# Patient Record
Sex: FEMALE | Race: WHITE
[De-identification: names, ages, dates, MRNs, and addresses within clinical notes are randomized per-mention and may not be internally consistent; named-entity substitution may affect disease eponyms.]

---

## 2017-03-01 ENCOUNTER — HOSPITAL ENCOUNTER (EMERGENCY)
Dept: HOSPITAL 17 - PHED | Age: 62
Discharge: HOME | End: 2017-03-01
Payer: COMMERCIAL

## 2017-03-01 VITALS — OXYGEN SATURATION: 99 % | HEART RATE: 64 BPM

## 2017-03-01 VITALS — HEIGHT: 65 IN | WEIGHT: 146.39 LBS | BODY MASS INDEX: 24.39 KG/M2

## 2017-03-01 VITALS
HEART RATE: 63 BPM | DIASTOLIC BLOOD PRESSURE: 76 MMHG | SYSTOLIC BLOOD PRESSURE: 137 MMHG | OXYGEN SATURATION: 100 % | TEMPERATURE: 97.9 F | RESPIRATION RATE: 16 BRPM

## 2017-03-01 DIAGNOSIS — T78.49XA: Primary | ICD-10-CM

## 2017-03-01 DIAGNOSIS — I10: ICD-10-CM

## 2017-03-01 DIAGNOSIS — Z86.79: ICD-10-CM

## 2017-03-01 DIAGNOSIS — Z86.2: ICD-10-CM

## 2017-03-01 DIAGNOSIS — Z87.448: ICD-10-CM

## 2017-03-01 DIAGNOSIS — Z86.59: ICD-10-CM

## 2017-03-01 DIAGNOSIS — Z86.69: ICD-10-CM

## 2017-03-01 DIAGNOSIS — Z87.09: ICD-10-CM

## 2017-03-01 DIAGNOSIS — E03.9: ICD-10-CM

## 2017-03-01 DIAGNOSIS — E78.00: ICD-10-CM

## 2017-03-01 DIAGNOSIS — Z87.39: ICD-10-CM

## 2017-03-01 PROCEDURE — 96372 THER/PROPH/DIAG INJ SC/IM: CPT

## 2017-03-01 PROCEDURE — 99282 EMERGENCY DEPT VISIT SF MDM: CPT

## 2017-03-01 NOTE — PD
HPI


Chief Complaint:  Skin Problem


Time Seen by Provider:  04:52


Travel History


International Travel<30 days:  No


Contact w/Intl Traveler<30days:  No


Traveled to known affect area:  No





History of Present Illness


HPI


The patient is a 61-year-old female that she had a rash for 2 days.  She states 

she did change laundry detergent but not the fabric softener.  She denies any 

wheezing, chest pain or shortness of breath.  She does not know whether she 

took epinephrine before in the past.  She denies any history of heart disease.





PFSH


Past Medical History


Arthritis:  Yes (RHEUMATIOS; OSTEO; SOJOURNS (QUESTIONABLE))


Anxiety:  Yes


Depression:  Yes


Heart Rhythm Problems:  Yes (PALPITATIONS)


Cancer:  No


Cardiovascular Problems:  Yes (HTN)


High Cholesterol:  Yes


COPD:  Yes (CHRONIC BRONCHITIS)


Diabetes:  No


Diminished Hearing:  No


Fibromyalgia:  Yes


Glaucoma:  No


Headaches:  Yes


Hepatitis:  No


Hiatal Hernia:  No


Herniated Disk:  Yes (REMOVED WITH SURGERY)


Hypertension:  Yes


Immune Disorder:  Yes (GRAVE'S Dz;)


Insomnia:  Yes


Kidney Stones:  Yes


Respiratory:  Yes (COPD)


Migraines:  Yes


Shingles:  Yes


Thyroid Disease:  Yes (HYPOTHYROID)


Pregnant?:  Not Pregnant


Menopausal:  Yes


:  0





Past Surgical History


Abdominal Surgery:  Yes (CHOLECYSTECTOMY  , UMBILICAL hernia repair)


Cardiac Surgery:  No


Cholecystectomy:  Yes


Ear Surgery:  No


Endocrine Surgery:  No


Eye Surgery:  No


Genitourinary Surgery:  No


Gynecologic Surgery:  Yes ( LT BREAST BX)


Oral Surgery:  Yes (RT SALIVARY GLAND BX )


Pacemaker:  No


Thoracic Surgery:  No


Other Surgery:  Yes (SALIVARY GLANDS OPENED (R); BREAST BIOPSY (L), back surgery

)





Family History


Family Hypercholesterolemia:  Yes





Social History


Alcohol Use:  No


Tobacco Use:  No


Substance Use:  No





Allergies-Medications


(Allergen,Severity, Reaction):  


Coded Allergies:  


     Benadryl (Verified  Allergy, Severe, HEADACHE AND HEART RACING, 3/1/17)


     Codeine (Unverified  Allergy, Intermediate, illness, 3/1/17)


     Tetracycline (Verified  Allergy, Mild, NAUSEA, 3/1/17)


     Ephedrine (Verified  Adverse Reaction, Mild, TACHYCARDIA, 3/1/17)


     Iodine (Verified  Adverse Reaction, Mild, HOT/FLUSHED FEELING, 3/1/17)


     Penicillin (Verified  Adverse Reaction, Mild, NAUSEA, 3/1/17)


Reported Meds & Prescriptions





Reported Meds & Active Scripts


Active


Prednisone 50 Mg Tab 50 Mg PO BID


Zantac (Ranitidine HCl) 150 Mg Tab 150 Mg PO BID


Reported


Plaquenil (Hydroxychloroquine Sulfate) 200 Mg Tab 200 Mg PO DAILY


     Take with food


Simponi Inj (Golimumab) 50 Mg/0.5 Ml Syr 50 Mg SQ Q30D


Topicort Topical (Desoximetasone) 0.25% Cream 1 Applic TOPICAL BID


Naftin Topical (Naftifine HCl) 2 % Cream 1 Applic TOPICAL BID


[Tacrolimus 1%]   1 Applic TOP BID


[Areds]   1 Tab PO BID


Heiskell Thyroid (Thyroid) 90 Mg Tab 90 Mg PO DAILY


Metoprolol Tartrate 50 Mg Tab 50 Mg PO HS


Percocet (Oxycodone-Acetaminophen) 5-325 mg Tab 0.05 Tab PO BID


Atorvastatin (Atorvastatin Calcium) 20 Mg Tab 20 Mg PO HS


Xanax (Alprazolam) 0.5 Mg Tab 0.5 Mg PO HS








Review of Systems


Except as stated in HPI:  all other systems reviewed are Neg





Physical Exam


Narrative


GENERAL: The patient is alert, oriented 3 in minimal distress with her 

pruritic rash.


SKIN: Warm and dry.  There is an erythematous rash, confluent located on the 

back, arms and slightly on the abdomen.  It is also on the legs.


HEAD: Atraumatic. Normocephalic. 


EYES: Pupils equal and round. No scleral icterus. No injection or drainage. 


ENT: No nasal bleeding or discharge.  Mucous membranes pink and moist.


NECK: Trachea midline. No JVD. 


CARDIOVASCULAR: Regular rate and rhythm.  No murmur appreciated.


RESPIRATORY: No accessory muscle use. Clear to auscultation. Breath sounds 

equal bilaterally. 


GASTROINTESTINAL: Abdomen soft, non-tender, nondistended. Hepatic and splenic 

margins not palpable. 


MUSCULOSKELETAL: No obvious deformities. No clubbing.  No cyanosis.  No edema. 


NEUROLOGICAL: Awake and alert. No obvious cranial nerve deficits.  Motor 

grossly within normal limits. Normal speech.


PSYCHIATRIC: Appropriate mood and affect; insight and judgment normal.





Data


Data


Last Documented VS





Vital Signs








  Date Time  Temp Pulse Resp B/P Pulse Ox O2 Delivery O2 Flow Rate FiO2


 


3/1/17 05:16  64   99   


 


3/1/17 04:44   16     


 


3/1/17 04:31 97.9   137/76    








Orders





 Ecg Monitoring (3/1/17 04:52)


Oximetry (3/1/17 04:52)


Prednisone (Deltasone) (3/1/17 05:00)


Epinephrine (1:1000) Inj (Adrenalin (1:1 (3/1/17 05:00)


Famotidine (Pepcid) (3/1/17 05:00)








MDM


Medical Decision Making


Medical Screen Exam Complete:  Yes


Emergency Medical Condition:  Yes


Medical Record Reviewed:  Yes


Differential Diagnosis


Allergic reaction, cellulitis, angioedema


Narrative Course


The patient has an allergic reaction.  It is now 0530 and the patient feels 

much better.  The patient's heart rate 1 only as high as 90.  The patient is 

obviously not allergic to epinephrine.





Plan: The patient be given Zantac, tapered course of prednisone.





Diagnosis





 Primary Impression:  


 Allergic reaction to chemical substance





***Additional Instructions:


The patient may be allergic to her dishwashing detergent.  She denies changing 

fabric softeners.





Plan: The patient be given a tapered course of prednisone and a prescription of 

Zantac.  She should follow-up with an allergist if this becomes a recurrent 

problem for her.


***Med/Other Pt SpecificInfo:  Prescription(s) given


Scripts


Prednisone 50 Mg Tab50 Mg PO BID  #12 TAB  Ref 0


   Prov:Nestor Perdomo MD         3/1/17 


Ranitidine (Zantac)150 Mg [id number] Mg PO BID  #30 TAB  Ref 0


   Prov:Nestor Perdomo MD         3/1/17


Disposition:  01 DISCHARGE HOME


Condition:  Stable








Nestor Perdomo MD Mar 1, 2017 04:58

## 2022-03-31 ENCOUNTER — APPOINTMENT (RX ONLY)
Dept: URBAN - METROPOLITAN AREA CLINIC 52 | Facility: CLINIC | Age: 67
Setting detail: DERMATOLOGY
End: 2022-03-31

## 2022-03-31 DIAGNOSIS — L85.3 XEROSIS CUTIS: ICD-10-CM | Status: STABLE

## 2022-03-31 DIAGNOSIS — Z85.820 PERSONAL HISTORY OF MALIGNANT MELANOMA OF SKIN: ICD-10-CM | Status: STABLE

## 2022-03-31 DIAGNOSIS — D22 MELANOCYTIC NEVI: ICD-10-CM | Status: STABLE

## 2022-03-31 DIAGNOSIS — L57.8 OTHER SKIN CHANGES DUE TO CHRONIC EXPOSURE TO NONIONIZING RADIATION: ICD-10-CM | Status: STABLE

## 2022-03-31 DIAGNOSIS — L72.0 EPIDERMAL CYST: ICD-10-CM

## 2022-03-31 DIAGNOSIS — L81.4 OTHER MELANIN HYPERPIGMENTATION: ICD-10-CM | Status: STABLE

## 2022-03-31 DIAGNOSIS — Z85.828 PERSONAL HISTORY OF OTHER MALIGNANT NEOPLASM OF SKIN: ICD-10-CM | Status: STABLE

## 2022-03-31 DIAGNOSIS — L82.1 OTHER SEBORRHEIC KERATOSIS: ICD-10-CM | Status: STABLE

## 2022-03-31 DIAGNOSIS — L57.0 ACTINIC KERATOSIS: ICD-10-CM

## 2022-03-31 DIAGNOSIS — L71.8 OTHER ROSACEA: ICD-10-CM | Status: WORSENING

## 2022-03-31 DIAGNOSIS — B35.1 TINEA UNGUIUM: ICD-10-CM | Status: INADEQUATELY CONTROLLED

## 2022-03-31 DIAGNOSIS — D18.0 HEMANGIOMA: ICD-10-CM | Status: STABLE

## 2022-03-31 PROBLEM — D18.01 HEMANGIOMA OF SKIN AND SUBCUTANEOUS TISSUE: Status: ACTIVE | Noted: 2022-03-31

## 2022-03-31 PROBLEM — D22.5 MELANOCYTIC NEVI OF TRUNK: Status: ACTIVE | Noted: 2022-03-31

## 2022-03-31 PROCEDURE — ? FULL BODY SKIN EXAM

## 2022-03-31 PROCEDURE — ? LIQUID NITROGEN

## 2022-03-31 PROCEDURE — ? TREATMENT REGIMEN

## 2022-03-31 PROCEDURE — ? ADDITIONAL NOTES

## 2022-03-31 PROCEDURE — ? SUNSCREEN TREATMENT REGIMEN

## 2022-03-31 PROCEDURE — ? COUNSELING

## 2022-03-31 PROCEDURE — 99204 OFFICE O/P NEW MOD 45 MIN: CPT | Mod: 25

## 2022-03-31 PROCEDURE — ? PRESCRIPTION

## 2022-03-31 PROCEDURE — 17000 DESTRUCT PREMALG LESION: CPT

## 2022-03-31 PROCEDURE — ? PRESCRIPTION MEDICATION MANAGEMENT

## 2022-03-31 PROCEDURE — ? SUNSCREEN RECOMMENDATIONS

## 2022-03-31 RX ORDER — METRONIDAZOLE 10 MG/G
1 GEL TOPICAL BID
Qty: 60 | Refills: 1 | Status: ERX | COMMUNITY
Start: 2022-03-31

## 2022-03-31 RX ORDER — DOXYCYCLINE HYCLATE 100 MG/1
1 TABLET, COATED ORAL BID
Qty: 60 | Refills: 0 | Status: ERX | COMMUNITY
Start: 2022-03-31

## 2022-03-31 RX ORDER — CICLOPIROX 80 MG/ML
1 SOLUTION TOPICAL QD
Qty: 19.79 | Refills: 11 | Status: ERX | COMMUNITY
Start: 2022-03-31

## 2022-03-31 RX ORDER — TRIAMCINOLONE ACETONIDE 1 MG/G
1 CREAM TOPICAL BID
Qty: 45 | Refills: 1 | Status: ERX | COMMUNITY
Start: 2022-03-31

## 2022-03-31 RX ADMIN — CICLOPIROX 1: 80 SOLUTION TOPICAL at 00:00

## 2022-03-31 RX ADMIN — TRIAMCINOLONE ACETONIDE 1: 1 CREAM TOPICAL at 00:00

## 2022-03-31 RX ADMIN — METRONIDAZOLE 1: 10 GEL TOPICAL at 00:00

## 2022-03-31 RX ADMIN — DOXYCYCLINE HYCLATE 1: 100 TABLET, COATED ORAL at 00:00

## 2022-03-31 ASSESSMENT — LOCATION ZONE DERM
LOCATION ZONE: LEG
LOCATION ZONE: TRUNK
LOCATION ZONE: TOE
LOCATION ZONE: FACE
LOCATION ZONE: NOSE
LOCATION ZONE: TOENAIL

## 2022-03-31 ASSESSMENT — LOCATION DETAILED DESCRIPTION DERM
LOCATION DETAILED: RIGHT INFERIOR CENTRAL MALAR CHEEK
LOCATION DETAILED: RIGHT SUPERIOR UPPER BACK
LOCATION DETAILED: RIGHT NARIS
LOCATION DETAILED: RIGHT SUPERIOR FOREHEAD
LOCATION DETAILED: LEFT INFERIOR CENTRAL MALAR CHEEK
LOCATION DETAILED: RIGHT GREAT TOENAIL
LOCATION DETAILED: RIGHT PROXIMAL PRETIBIAL REGION
LOCATION DETAILED: EPIGASTRIC SKIN
LOCATION DETAILED: LEFT DISTAL PLANTAR GREAT TOE
LOCATION DETAILED: LEFT PROXIMAL PRETIBIAL REGION
LOCATION DETAILED: RIGHT INFERIOR MEDIAL FOREHEAD
LOCATION DETAILED: SUPERIOR THORACIC SPINE

## 2022-03-31 ASSESSMENT — LOCATION SIMPLE DESCRIPTION DERM
LOCATION SIMPLE: UPPER BACK
LOCATION SIMPLE: RIGHT NOSE
LOCATION SIMPLE: RIGHT GREAT TOE
LOCATION SIMPLE: LEFT GREAT TOE
LOCATION SIMPLE: LEFT CHEEK
LOCATION SIMPLE: RIGHT FOREHEAD
LOCATION SIMPLE: RIGHT UPPER BACK
LOCATION SIMPLE: ABDOMEN
LOCATION SIMPLE: LEFT PRETIBIAL REGION
LOCATION SIMPLE: RIGHT CHEEK
LOCATION SIMPLE: RIGHT PRETIBIAL REGION

## 2022-03-31 NOTE — PROCEDURE: PRESCRIPTION MEDICATION MANAGEMENT
Initiate Treatment: metronidazole 1 % topical gel bID\\nQuantity: 60.0 g  Days Supply: 30\\nSig: Apply twice daily to face\\n\\ndoxycycline hyclate 100 mg tablet BID\\nQuantity: 60.0 Tablet  Days Supply: 30\\nSi tab PO BID with large glass of water and food.
Detail Level: Zone
Render In Strict Bullet Format?: No
Initiate Treatment: ciclopirox 8 % topical solution Qd\\nQuantity: 19.79 ml  Days Supply: 90\\nSig: Use on affected nails everyday for 48 weeks.
Initiate Treatment: triamcinolone acetonide 0.1 % topical cream BID\\nQuantity: 80.0 g  Days Supply: 30\\nSig: Apply to affected areas on legs BID x 2wks prn flare. Do not use on face or groin area.